# Patient Record
Sex: FEMALE | Race: WHITE | NOT HISPANIC OR LATINO
[De-identification: names, ages, dates, MRNs, and addresses within clinical notes are randomized per-mention and may not be internally consistent; named-entity substitution may affect disease eponyms.]

---

## 2017-08-21 ENCOUNTER — APPOINTMENT (OUTPATIENT)
Dept: PLASTIC SURGERY | Facility: CLINIC | Age: 22
End: 2017-08-21
Payer: COMMERCIAL

## 2017-08-21 VITALS
WEIGHT: 130 LBS | HEIGHT: 66 IN | DIASTOLIC BLOOD PRESSURE: 66 MMHG | BODY MASS INDEX: 20.89 KG/M2 | HEART RATE: 83 BPM | TEMPERATURE: 98.1 F | SYSTOLIC BLOOD PRESSURE: 104 MMHG

## 2017-08-21 PROCEDURE — G0429: CPT

## 2017-12-18 ENCOUNTER — APPOINTMENT (OUTPATIENT)
Dept: PLASTIC SURGERY | Facility: CLINIC | Age: 22
End: 2017-12-18
Payer: COMMERCIAL

## 2017-12-18 PROCEDURE — G0429: CPT

## 2017-12-18 PROCEDURE — 99199 UNLISTED SPECIAL SVC PX/RPRT: CPT | Mod: NC

## 2017-12-18 RX ORDER — LIDOCAINE 40 MG/G
4 CREAM TOPICAL
Qty: 1 | Refills: 0 | Status: ACTIVE | COMMUNITY
Start: 2017-12-18 | End: 1900-01-01

## 2018-03-12 ENCOUNTER — APPOINTMENT (OUTPATIENT)
Dept: PLASTIC SURGERY | Facility: CLINIC | Age: 23
End: 2018-03-12
Payer: COMMERCIAL

## 2018-03-12 PROCEDURE — G0429: CPT

## 2018-12-20 ENCOUNTER — APPOINTMENT (OUTPATIENT)
Dept: PLASTIC SURGERY | Facility: CLINIC | Age: 23
End: 2018-12-20
Payer: COMMERCIAL

## 2018-12-20 PROCEDURE — G0429: CPT

## 2020-04-21 ENCOUNTER — TRANSCRIPTION ENCOUNTER (OUTPATIENT)
Age: 25
End: 2020-04-21

## 2020-04-22 ENCOUNTER — INPATIENT (INPATIENT)
Facility: HOSPITAL | Age: 25
LOS: 0 days | Discharge: HOME | DRG: 743 | End: 2020-04-22
Attending: OBSTETRICS & GYNECOLOGY | Admitting: OBSTETRICS & GYNECOLOGY
Payer: COMMERCIAL

## 2020-04-22 VITALS
TEMPERATURE: 98 F | WEIGHT: 139.99 LBS | HEIGHT: 65 IN | HEART RATE: 85 BPM | RESPIRATION RATE: 19 BRPM | DIASTOLIC BLOOD PRESSURE: 73 MMHG | SYSTOLIC BLOOD PRESSURE: 102 MMHG | OXYGEN SATURATION: 94 %

## 2020-04-22 VITALS
RESPIRATION RATE: 16 BRPM | SYSTOLIC BLOOD PRESSURE: 117 MMHG | TEMPERATURE: 98 F | HEART RATE: 88 BPM | OXYGEN SATURATION: 100 % | DIASTOLIC BLOOD PRESSURE: 65 MMHG

## 2020-04-22 DIAGNOSIS — N83.519 TORSION OF OVARY AND OVARIAN PEDICLE, UNSPECIFIED SIDE: ICD-10-CM

## 2020-04-22 LAB
ALBUMIN SERPL ELPH-MCNC: 4.8 G/DL — SIGNIFICANT CHANGE UP (ref 3.3–5)
ALP SERPL-CCNC: 35 U/L — LOW (ref 40–120)
ALT FLD-CCNC: 24 U/L — SIGNIFICANT CHANGE UP (ref 10–45)
ANION GAP SERPL CALC-SCNC: 14 MMOL/L — SIGNIFICANT CHANGE UP (ref 5–17)
APTT BLD: 27.4 SEC — LOW (ref 27.5–36.3)
AST SERPL-CCNC: 20 U/L — SIGNIFICANT CHANGE UP (ref 10–40)
BASOPHILS # BLD AUTO: 0.01 K/UL — SIGNIFICANT CHANGE UP (ref 0–0.2)
BASOPHILS NFR BLD AUTO: 0.1 % — SIGNIFICANT CHANGE UP (ref 0–2)
BILIRUB SERPL-MCNC: 0.4 MG/DL — SIGNIFICANT CHANGE UP (ref 0.2–1.2)
BLD GP AB SCN SERPL QL: NEGATIVE — SIGNIFICANT CHANGE UP
BUN SERPL-MCNC: 14 MG/DL — SIGNIFICANT CHANGE UP (ref 7–23)
CALCIUM SERPL-MCNC: 9.8 MG/DL — SIGNIFICANT CHANGE UP (ref 8.4–10.5)
CHLORIDE SERPL-SCNC: 102 MMOL/L — SIGNIFICANT CHANGE UP (ref 96–108)
CO2 SERPL-SCNC: 25 MMOL/L — SIGNIFICANT CHANGE UP (ref 22–31)
CREAT SERPL-MCNC: 0.77 MG/DL — SIGNIFICANT CHANGE UP (ref 0.5–1.3)
EOSINOPHIL # BLD AUTO: 0.04 K/UL — SIGNIFICANT CHANGE UP (ref 0–0.5)
EOSINOPHIL NFR BLD AUTO: 0.6 % — SIGNIFICANT CHANGE UP (ref 0–6)
GLUCOSE SERPL-MCNC: 118 MG/DL — HIGH (ref 70–99)
HCG SERPL-ACNC: <2 MIU/ML — SIGNIFICANT CHANGE UP
HCT VFR BLD CALC: 40.4 % — SIGNIFICANT CHANGE UP (ref 34.5–45)
HGB BLD-MCNC: 13.7 G/DL — SIGNIFICANT CHANGE UP (ref 11.5–15.5)
IMM GRANULOCYTES NFR BLD AUTO: 0.3 % — SIGNIFICANT CHANGE UP (ref 0–1.5)
INR BLD: 1.08 RATIO — SIGNIFICANT CHANGE UP (ref 0.88–1.16)
LYMPHOCYTES # BLD AUTO: 1.86 K/UL — SIGNIFICANT CHANGE UP (ref 1–3.3)
LYMPHOCYTES # BLD AUTO: 26.7 % — SIGNIFICANT CHANGE UP (ref 13–44)
MCHC RBC-ENTMCNC: 31.9 PG — SIGNIFICANT CHANGE UP (ref 27–34)
MCHC RBC-ENTMCNC: 33.9 GM/DL — SIGNIFICANT CHANGE UP (ref 32–36)
MCV RBC AUTO: 94 FL — SIGNIFICANT CHANGE UP (ref 80–100)
MONOCYTES # BLD AUTO: 0.45 K/UL — SIGNIFICANT CHANGE UP (ref 0–0.9)
MONOCYTES NFR BLD AUTO: 6.5 % — SIGNIFICANT CHANGE UP (ref 2–14)
NEUTROPHILS # BLD AUTO: 4.59 K/UL — SIGNIFICANT CHANGE UP (ref 1.8–7.4)
NEUTROPHILS NFR BLD AUTO: 65.8 % — SIGNIFICANT CHANGE UP (ref 43–77)
NRBC # BLD: 0 /100 WBCS — SIGNIFICANT CHANGE UP (ref 0–0)
PLATELET # BLD AUTO: 279 K/UL — SIGNIFICANT CHANGE UP (ref 150–400)
POTASSIUM SERPL-MCNC: 3.9 MMOL/L — SIGNIFICANT CHANGE UP (ref 3.5–5.3)
POTASSIUM SERPL-SCNC: 3.9 MMOL/L — SIGNIFICANT CHANGE UP (ref 3.5–5.3)
PROT SERPL-MCNC: 7.7 G/DL — SIGNIFICANT CHANGE UP (ref 6–8.3)
PROTHROM AB SERPL-ACNC: 12.5 SEC — SIGNIFICANT CHANGE UP (ref 10–12.9)
RBC # BLD: 4.3 M/UL — SIGNIFICANT CHANGE UP (ref 3.8–5.2)
RBC # FLD: 11.7 % — SIGNIFICANT CHANGE UP (ref 10.3–14.5)
RH IG SCN BLD-IMP: POSITIVE — SIGNIFICANT CHANGE UP
RH IG SCN BLD-IMP: POSITIVE — SIGNIFICANT CHANGE UP
SARS-COV-2 RNA SPEC QL NAA+PROBE: SIGNIFICANT CHANGE UP
SODIUM SERPL-SCNC: 141 MMOL/L — SIGNIFICANT CHANGE UP (ref 135–145)
WBC # BLD: 6.97 K/UL — SIGNIFICANT CHANGE UP (ref 3.8–10.5)
WBC # FLD AUTO: 6.97 K/UL — SIGNIFICANT CHANGE UP (ref 3.8–10.5)

## 2020-04-22 PROCEDURE — 85027 COMPLETE CBC AUTOMATED: CPT

## 2020-04-22 PROCEDURE — 76830 TRANSVAGINAL US NON-OB: CPT | Mod: 26

## 2020-04-22 PROCEDURE — 93975 VASCULAR STUDY: CPT

## 2020-04-22 PROCEDURE — 99291 CRITICAL CARE FIRST HOUR: CPT

## 2020-04-22 PROCEDURE — 49000 EXPLORATION OF ABDOMEN: CPT

## 2020-04-22 PROCEDURE — 76830 TRANSVAGINAL US NON-OB: CPT

## 2020-04-22 PROCEDURE — 86850 RBC ANTIBODY SCREEN: CPT

## 2020-04-22 PROCEDURE — 84702 CHORIONIC GONADOTROPIN TEST: CPT

## 2020-04-22 PROCEDURE — 76856 US EXAM PELVIC COMPLETE: CPT | Mod: 26,59

## 2020-04-22 PROCEDURE — 87635 SARS-COV-2 COVID-19 AMP PRB: CPT

## 2020-04-22 PROCEDURE — 86900 BLOOD TYPING SEROLOGIC ABO: CPT

## 2020-04-22 PROCEDURE — 96375 TX/PRO/DX INJ NEW DRUG ADDON: CPT

## 2020-04-22 PROCEDURE — 80053 COMPREHEN METABOLIC PANEL: CPT

## 2020-04-22 PROCEDURE — 86901 BLOOD TYPING SEROLOGIC RH(D): CPT

## 2020-04-22 PROCEDURE — 99285 EMERGENCY DEPT VISIT HI MDM: CPT | Mod: 25

## 2020-04-22 PROCEDURE — C1889: CPT

## 2020-04-22 PROCEDURE — 93975 VASCULAR STUDY: CPT | Mod: 26

## 2020-04-22 PROCEDURE — 96374 THER/PROPH/DIAG INJ IV PUSH: CPT

## 2020-04-22 PROCEDURE — 85610 PROTHROMBIN TIME: CPT

## 2020-04-22 PROCEDURE — 76856 US EXAM PELVIC COMPLETE: CPT

## 2020-04-22 PROCEDURE — 85730 THROMBOPLASTIN TIME PARTIAL: CPT

## 2020-04-22 RX ORDER — HYDROMORPHONE HYDROCHLORIDE 2 MG/ML
0.5 INJECTION INTRAMUSCULAR; INTRAVENOUS; SUBCUTANEOUS
Refills: 0 | Status: DISCONTINUED | OUTPATIENT
Start: 2020-04-22 | End: 2020-04-22

## 2020-04-22 RX ORDER — OXYCODONE HYDROCHLORIDE 5 MG/1
1 TABLET ORAL
Qty: 5 | Refills: 0
Start: 2020-04-22 | End: 2020-04-22

## 2020-04-22 RX ORDER — SODIUM CHLORIDE 9 MG/ML
1000 INJECTION, SOLUTION INTRAVENOUS
Refills: 0 | Status: DISCONTINUED | OUTPATIENT
Start: 2020-04-22 | End: 2020-04-22

## 2020-04-22 RX ORDER — METOCLOPRAMIDE HCL 10 MG
10 TABLET ORAL ONCE
Refills: 0 | Status: DISCONTINUED | OUTPATIENT
Start: 2020-04-22 | End: 2020-04-22

## 2020-04-22 RX ORDER — HYDROMORPHONE HYDROCHLORIDE 2 MG/ML
0.5 INJECTION INTRAMUSCULAR; INTRAVENOUS; SUBCUTANEOUS ONCE
Refills: 0 | Status: DISCONTINUED | OUTPATIENT
Start: 2020-04-22 | End: 2020-04-22

## 2020-04-22 RX ORDER — ACETAMINOPHEN 500 MG
650 TABLET ORAL ONCE
Refills: 0 | Status: COMPLETED | OUTPATIENT
Start: 2020-04-22 | End: 2020-04-22

## 2020-04-22 RX ORDER — ONDANSETRON 8 MG/1
4 TABLET, FILM COATED ORAL ONCE
Refills: 0 | Status: COMPLETED | OUTPATIENT
Start: 2020-04-22 | End: 2020-04-22

## 2020-04-22 RX ORDER — ONDANSETRON 8 MG/1
4 TABLET, FILM COATED ORAL ONCE
Refills: 0 | Status: DISCONTINUED | OUTPATIENT
Start: 2020-04-22 | End: 2020-04-22

## 2020-04-22 RX ADMIN — HYDROMORPHONE HYDROCHLORIDE 0.5 MILLIGRAM(S): 2 INJECTION INTRAMUSCULAR; INTRAVENOUS; SUBCUTANEOUS at 10:24

## 2020-04-22 RX ADMIN — ONDANSETRON 4 MILLIGRAM(S): 8 TABLET, FILM COATED ORAL at 08:47

## 2020-04-22 RX ADMIN — Medication 650 MILLIGRAM(S): at 08:46

## 2020-04-22 NOTE — ED PROVIDER NOTE - CRITICAL CARE PROVIDED
documentation/consult w/ pt's family directly relating to pts condition/consultation with other physicians/conducted a detailed discussion of DNR status/direct patient care (not related to procedure)/interpretation of diagnostic studies/additional history taking

## 2020-04-22 NOTE — H&P ADULT - PROBLEM SELECTOR PLAN 1
- admit to gyn  - NPO  - pre op labs, T&S x2  - add on to OR for diagnostic laparoscopy, possible unilateral cystectomy, possible unilateral salpingectomy, possible unilateral oophorectomy    Fernanda Pizarro PGY3  dw Dr Do

## 2020-04-22 NOTE — ASU DISCHARGE PLAN (ADULT/PEDIATRIC) - CALL YOUR DOCTOR IF YOU HAVE ANY OF THE FOLLOWING:
Bleeding that does not stop/Swelling that gets worse/Increased irritability or sluggishness/Fever greater than (need to indicate Fahrenheit or Celsius)/Pain not relieved by Medications

## 2020-04-22 NOTE — PRE-ANESTHESIA EVALUATION ADULT - NSANTHOSAYNRD_GEN_A_CORE
No. BOAZ screening performed.  STOP BANG Legend: 0-2 = LOW Risk; 3-4 = INTERMEDIATE Risk; 5-8 = HIGH Risk

## 2020-04-22 NOTE — H&P ADULT - ATTENDING COMMENTS
Patient seen at bedside, agree with above assessment, with acute LLQ pain, received Dilaudid in ED and still in pain, TVUS with polycystic ovaries concern for torsion. Patient spoken to at bedside, understands risks associated with surgery including but not limited to VTE, bleeding, surgical site infection, damage to bowel/bladder. On ultrasound review no ovarian or adenxal/ mass seen, understands that if ovarian cyst is found will remove cyst, if fallopian tube torsion if found will remove fallopian tube, patient understands risk of losing ovary if uncontrolled bleeding. all questions answered. willing to accept blood products in case of emergency. consented for laparoscopic surgery possible laparotomy.

## 2020-04-22 NOTE — ED PROVIDER NOTE - CLINICAL SUMMARY MEDICAL DECISION MAKING FREE TEXT BOX
Impression:  DDx of HCG-negative LLQ/Pelvic pain includes ovarian cyst/torsion.  Patient already in US, but pain has been constant for 10hrs. If she has torsion, high likelihood of ovarian loss.  Plan:  TVUS, UA, basic labs, pelvic exam as soon as back from US, reassess.

## 2020-04-22 NOTE — ED PROVIDER NOTE - CARE PLAN
Principal Discharge DX:	Ovarian torsion  Secondary Diagnosis:	Ovarian mass, left  Secondary Diagnosis:	Cyst of left ovary

## 2020-04-22 NOTE — ED PROVIDER NOTE - ATTENDING CONTRIBUTION TO CARE
MD Parker:  patient seen and evaluated with the PA.  I was present for key portions of the History and Physical, and I agree with the Impression and Plan.    MD Parker:  25 yo F, c/o sharp LLQ pain.  Onset: 11pm.  Intensity: 9/10.  Quality:  sharp and stabbing.  Associated Sx: +nausea with waves of pain, but no diarrhea, no F/C, no VB, no VD, no back pain.   HCG negative.  VS: wnl.  Physical Exam: adult M, NAD, NCAT, PERRL, EOMI, neck supple, RRR, CTA B, Abd: s/nd/+LLQ TTP.  Ext: no edema, Neuro: AAOx3, ambulates w/o diff, strength 5/5 & symmetric throughout.  Impression:  DDx of HCG-negative LLQ/Pelvic pain includes ovarian cyst/torsion.  Patient already in US, but pain has been constant for 10hrs. If she has torsion, high likelihood of ovarian loss.  Plan:  TVUS, UA, basic labs, pelvic exam as soon as back from US, reassess.

## 2020-04-22 NOTE — ED ADULT NURSE NOTE - CHPI ED NUR SYMPTOMS NEG
no abdominal distension/no diarrhea/no blood in stool/no chills/no dysuria/no fever/no hematuria/no burning urination

## 2020-04-22 NOTE — ED ADULT NURSE NOTE - OBJECTIVE STATEMENT
25 yo F w/ no PMHx presents to ED via waiting room c/o LLQ pain. Pt states pain began last night, has been persistent since. Associated w/ nausea and vomiting, worsened by PO intake. Pt reports she took tylenol PTA, but vomited a few minutes after. States LMP 3/2020, has been irregular in the past, this pain is worse than other symptoms/cramps in the past. Abd soft, not distended, tender over LLQ. Rates pain 10/10. No vaginal discharge or bleeding. Sexually active, not currently on OCP. No recent changes in PO intake. Pt reports some worsening of pain w/ urination but denies burning, hematuria, dysuria. Pt denies any CP, SOB, fever, chills, constipation, diarrhea, HA, dizziness, weakness. Pt A&Ox4, lungs CTA, +central pulses. Ambulating w/ steady gait, safety and comfort maintained, no acute distress noted at this time.

## 2020-04-22 NOTE — ED ADULT NURSE REASSESSMENT NOTE - NS ED NURSE REASSESS COMMENT FT1
Pt returned from US exam. Reports no change in pain, had additional episode of vomiting after tylenol administration. PA notified.

## 2020-04-22 NOTE — H&P ADULT - ASSESSMENT
25yo P0 PMH PCOS presents to the ED this morning due to severe LLQ pain beginning at 11pm last night.  Clinical exam and TVUS suspicious for torsion.

## 2020-04-22 NOTE — H&P ADULT - HISTORY OF PRESENT ILLNESS
JOCE MEDINA  24y  Female [id number]    HPI:  25yo P0 PMH PCOS presents to the ED this morning due to severe LLQ pain beginning at 11pm last night. Pt reports pain began suddenly and has not experienced anything like this prior. Pain caused her to have multiple episodes of nausea and vomiting.  In the ED pt had additional episodes of nausea and vomiting requiring zofran and pain requiring Dilaudid and tylenol. Denies vaginal bleeding, chest pain, SOB, palpitations.      Name of GYN Physician: Dr Rao    POB: none    Pgyn: +PCOS, diagnosed 2 years ago, placed on OCPs, has since d/c   PMH: denies  PSH: tonsillectomy in childhood  All: NKDA    Home meds: none    Hospital Meds:   MEDICATIONS  (STANDING):  HYDROmorphone  Injectable 0.5 milliGRAM(s) IV Push Once    MEDICATIONS  (PRN):      Social History:  Denies smoking use, drug use, alcohol use.

## 2020-04-22 NOTE — ASU DISCHARGE PLAN (ADULT/PEDIATRIC) - ASU DC SPECIAL INSTRUCTIONSFT
Contact your doctor if you are soaking a pad every 30 minutes to an hour or experience clots. Call your doctor for any SIGNS OR SYMPTOMS OF INFECTION: Fever, chills, temperature  100.4 or higher or have a foul smelling discharge.    Medications for discharge:  pain: tylenol 957mg every 6 hours and motrin 600 mg every 6 hours, a Rx for oxycodone has been sent to the pharmacy, take for severe pain  gas pain: simethicone (gas ex) every 6 hours as needed  constipation: colace twice a day, senna tablets (over the counter)    follow up in 2 weeks with Dr Rao

## 2020-04-22 NOTE — ED PROVIDER NOTE - PROGRESS NOTE DETAILS
MD Parker:  called by radiology attg regarding US results.  Large, heterogenous-appearing, cystic L ovary with diminished flow.  Concerning for Torsion.  OB/GYN immediately consulted.  Patient is traveling down to ED from US for GYN bedside evaluation. MD Parker:  ob/gyn agrees that case is concerning for torsion.  patient admitted and likely to OR

## 2020-04-22 NOTE — ASU DISCHARGE PLAN (ADULT/PEDIATRIC) - CARE PROVIDER_API CALL
Tasia Rao)  Obstetrics and Gynecology  [address] [facility], [address]  [place] [address]  Phone: [phone]  Fax: [phone]  Follow Up Time:

## 2020-04-22 NOTE — ED PROVIDER NOTE - OBJECTIVE STATEMENT
23yo F with no significant PMH presenting with sharp, constant, LLQ/L pelvic pain worsening since 11pm last night. Pain is now 10/10 in severity, radiates to L lower back. + associated nausea and 1-2 episodes of nb/nb vomiting. Unable to tolerate PO. Took tylenol but then vomited. Nothing makes pain better.  Feels discomfort in LLQ when 23yo F with no significant PMH presenting with sharp, constant, LLQ/L pelvic pain worsening since 11pm last night. Pain is now 10/10 in severity, radiates to L lower back. + associated nausea and 1-2 episodes of nb/nb vomiting. Unable to tolerate PO. Took tylenol but then vomited. Nothing makes pain better.  Feels discomfort in LLQ when urinating. Of note, reports h/o irregular menses and b/l ovarian cysts. LMP last week of March 2020. Stopped OCP use in 2018. Sexually active. Denies any fever/chills, recent illness, URI sx, diarrhea, dysuria, abnormal vaginal discharge, vaginal bleeding. 25yo F with no significant PMH presenting with sharp, constant, LLQ/L pelvic pain worsening since 11pm last night. Pain is now 10/10 in severity, radiates to L lower back. + associated nausea and 1-2 episodes of nb/nb vomiting. Unable to tolerate PO. Took tylenol but then vomited. Never had pain like this before. Feels better when lying on R side. Feels discomfort in LLQ when urinating. Of note, reports h/o irregular menses and b/l ovarian cysts. LMP last week of March 2020. Stopped OCP use in 2018. Sexually active. Denies any fever/chills, recent illness, URI sx, diarrhea, dysuria, abnormal vaginal discharge, vaginal bleeding.

## 2020-04-22 NOTE — BRIEF OPERATIVE NOTE - OPERATION/FINDINGS
IP ligament of left ovary torsed x3. ovary appeared multicystic.  Left fallopian tube appeared enlarged  right ovary and tube wnl IP ligament of left ovary torsed x3. ovary appeared multicystic.  Left fallopian tube appeared enlarged  right ovary and tube [facility]    dictation# [id number]

## 2020-04-22 NOTE — H&P ADULT - NSHPLABSRESULTS_GEN_ALL_CORE
LABS:                            13.7   6.97  )-----------( 279      ( 22 Apr 2020 08:58 )             40.4     04-22    141  |  102  |  14  ----------------------------<  118<H>  3.9   |  25  |  0.77    Ca    9.8      22 Apr 2020 08:58    TPro  7.7  /  Alb  4.8  /  TBili  0.4  /  DBili  x   /  AST  20  /  ALT  24  /  AlkPhos  35<L>  04-22    I&O's Detail          RADIOLOGY & ADDITIONAL STUDIES:  < from: US Transvaginal (04.22.20 @ 09:43) >      EXAM:  US PELVIC COMPLETE                          EXAM:  US TRANSVAGINAL                          EXAM:  US DPLX PELVIC                            PROCEDURE DATE:  04/22/2020            INTERPRETATION:  CLINICAL INFORMATION: Left adnexal pain since 11:00 PM 4/21/2020. Given Tylenol. Patient gives history of PCO S.     LMP: 3/25/2020    COMPARISON: None available.    TECHNIQUE:     Transabdominal and transvaginal pelvic sonography was performed. Color and spectral Doppler imaging was performed to assess for blood flow within the ovaries bilaterally.    FINDINGS:    Uterus: Anteverted measuring 7 cm x 3.9 cm x 4.7 cm. cm. Within normal limits.    Endometrium: 11 mm. Within normal limits.    Right ovary: 2.1 cm x 3.5 cm x 2 cm. No mass or cyst. Color and spectral Doppler imaging reveals arterial and venous blood flow. Within normal limits.     Left ovary: Enlarged measuring 6.6 cm x 2.7 cm x 5 cm cm. Heterogeneous central stroma. Peripheral displacement of follicles. Arterial and venous blood flow is documented with color and spectral Doppler imaging. Adjacent to the left ovary is an abnormal appearing fallopian tube/vascular pedicle with some twisting appreciated.    Fluid: Small free fluid within the cul-de-sac.    IMPRESSION:    Enlarged abnormal left ovary with heterogeneous central stroma. Adjacent to the left ovary is an abnormal appearing left adnexal structure representing the fallopian tube/vascular pedicle with a twisted appearance. Patient has pain directly when scanning the left ovary/left adnexal region. Despite obtaining vascular blood flow within the left ovarian parenchyma, findings are highly suspicious for intermittent left ovarian/tubal torsion.    There is associated small free fluid within the cul-de-sac.    Dr. Allen is aware. 4/22/2020 at 9:50 AM.                    NI DANIEL M.D., ATTENDING RADIOLOGIST  This document has been electronically signed. Apr 22 2020  9:50AM                < end of copied text >

## 2020-04-22 NOTE — H&P ADULT - NSHPPHYSICALEXAM_GEN_ALL_CORE
Vital Signs Last 24 Hrs  T(C): 36.8 (22 Apr 2020 08:28), Max: 36.8 (22 Apr 2020 08:11)  T(F): 98.2 (22 Apr 2020 08:28), Max: 98.2 (22 Apr 2020 08:11)  HR: 73 (22 Apr 2020 08:28) (73 - 85)  BP: 119/78 (22 Apr 2020 08:28) (102/73 - 119/78)  BP(mean): --  RR: 18 (22 Apr 2020 08:28) (18 - 19)  SpO2: 100% (22 Apr 2020 08:28) (94% - 100%)    Physical Exam:   General: sitting comfortably in bed, NAD   Abd: Soft, non distended, LLQ tenderness, no rebound or guarding   :  No bleeding on pad.  uterus wnl, R adnexa wnl, L adnexal tenderness, enlarged L adnexa   Ext: non-tender b/l, no edema

## 2020-07-23 ENCOUNTER — APPOINTMENT (OUTPATIENT)
Dept: PLASTIC SURGERY | Facility: CLINIC | Age: 25
End: 2020-07-23

## 2020-11-17 NOTE — ED PROVIDER NOTE - DATE/TIME 1
Patient left a voice mail   Looking for the status of the [facility] medcial for her supplies     Have they been faxed back in    22-Apr-2020 09:59

## 2021-03-22 ENCOUNTER — APPOINTMENT (OUTPATIENT)
Dept: PLASTIC SURGERY | Facility: CLINIC | Age: 26
End: 2021-03-22
Payer: COMMERCIAL

## 2021-03-22 PROCEDURE — G0429: CPT

## 2021-03-22 PROCEDURE — 99072 ADDL SUPL MATRL&STAF TM PHE: CPT

## 2021-04-13 ENCOUNTER — TRANSCRIPTION ENCOUNTER (OUTPATIENT)
Age: 26
End: 2021-04-13

## 2021-06-28 ENCOUNTER — APPOINTMENT (OUTPATIENT)
Dept: PLASTIC SURGERY | Facility: CLINIC | Age: 26
End: 2021-06-28
Payer: COMMERCIAL

## 2021-06-28 VITALS — HEIGHT: 65 IN | BODY MASS INDEX: 21.33 KG/M2 | WEIGHT: 128 LBS

## 2021-06-28 DIAGNOSIS — Z80.49 FAMILY HISTORY OF MALIGNANT NEOPLASM OF OTHER GENITAL ORGANS: ICD-10-CM

## 2021-06-28 DIAGNOSIS — N64.81 PTOSIS OF BREAST: ICD-10-CM

## 2021-06-28 DIAGNOSIS — Z86.39 PERSONAL HISTORY OF OTHER ENDOCRINE, NUTRITIONAL AND METABOLIC DISEASE: ICD-10-CM

## 2021-06-28 DIAGNOSIS — Z80.3 FAMILY HISTORY OF MALIGNANT NEOPLASM OF BREAST: ICD-10-CM

## 2021-06-28 DIAGNOSIS — Z87.19 PERSONAL HISTORY OF OTHER DISEASES OF THE DIGESTIVE SYSTEM: ICD-10-CM

## 2021-06-28 DIAGNOSIS — Z78.9 OTHER SPECIFIED HEALTH STATUS: ICD-10-CM

## 2021-06-28 PROCEDURE — 99202 OFFICE O/P NEW SF 15 MIN: CPT

## 2021-06-28 PROCEDURE — 99072 ADDL SUPL MATRL&STAF TM PHE: CPT

## 2021-06-28 PROCEDURE — 99499Q: CUSTOM | Mod: NC

## 2021-06-28 RX ORDER — LINACLOTIDE 72 UG/1
CAPSULE, GELATIN COATED ORAL
Refills: 0 | Status: ACTIVE | COMMUNITY

## 2021-06-28 RX ORDER — LEVOTHYROXINE SODIUM 137 UG/1
TABLET ORAL
Refills: 0 | Status: ACTIVE | COMMUNITY

## 2021-06-28 RX ORDER — NORETHINDRONE ACETATE AND ETHINYL ESTRADIOL, AND FERROUS FUMARATE 1MG-20(24)
KIT ORAL
Refills: 0 | Status: ACTIVE | COMMUNITY

## 2021-08-02 ENCOUNTER — TRANSCRIPTION ENCOUNTER (OUTPATIENT)
Age: 26
End: 2021-08-02

## 2021-08-06 ENCOUNTER — TRANSCRIPTION ENCOUNTER (OUTPATIENT)
Age: 26
End: 2021-08-06

## 2021-12-30 ENCOUNTER — OUTPATIENT (OUTPATIENT)
Dept: OUTPATIENT SERVICES | Facility: HOSPITAL | Age: 26
LOS: 1 days | End: 2021-12-30

## 2021-12-30 VITALS
TEMPERATURE: 98 F | RESPIRATION RATE: 16 BRPM | OXYGEN SATURATION: 99 % | WEIGHT: 138.89 LBS | SYSTOLIC BLOOD PRESSURE: 113 MMHG | HEART RATE: 64 BPM | DIASTOLIC BLOOD PRESSURE: 70 MMHG | HEIGHT: 64 IN

## 2021-12-30 DIAGNOSIS — Z90.89 ACQUIRED ABSENCE OF OTHER ORGANS: Chronic | ICD-10-CM

## 2021-12-30 DIAGNOSIS — Z86.39 PERSONAL HISTORY OF OTHER ENDOCRINE, NUTRITIONAL AND METABOLIC DISEASE: ICD-10-CM

## 2021-12-30 DIAGNOSIS — M21.612 BUNION OF LEFT FOOT: ICD-10-CM

## 2021-12-30 DIAGNOSIS — Z98.890 OTHER SPECIFIED POSTPROCEDURAL STATES: Chronic | ICD-10-CM

## 2021-12-30 DIAGNOSIS — M20.12 HALLUX VALGUS (ACQUIRED), LEFT FOOT: ICD-10-CM

## 2021-12-30 LAB
ANION GAP SERPL CALC-SCNC: 13 MMOL/L — SIGNIFICANT CHANGE UP (ref 7–14)
BUN SERPL-MCNC: 18 MG/DL — SIGNIFICANT CHANGE UP (ref 7–23)
CALCIUM SERPL-MCNC: 9.5 MG/DL — SIGNIFICANT CHANGE UP (ref 8.4–10.5)
CHLORIDE SERPL-SCNC: 103 MMOL/L — SIGNIFICANT CHANGE UP (ref 98–107)
CO2 SERPL-SCNC: 24 MMOL/L — SIGNIFICANT CHANGE UP (ref 22–31)
CREAT SERPL-MCNC: 0.97 MG/DL — SIGNIFICANT CHANGE UP (ref 0.5–1.3)
GLUCOSE SERPL-MCNC: 83 MG/DL — SIGNIFICANT CHANGE UP (ref 70–99)
HCG UR QL: NEGATIVE — SIGNIFICANT CHANGE UP
HCT VFR BLD CALC: 38.6 % — SIGNIFICANT CHANGE UP (ref 34.5–45)
HGB BLD-MCNC: 13.2 G/DL — SIGNIFICANT CHANGE UP (ref 11.5–15.5)
MCHC RBC-ENTMCNC: 32.4 PG — SIGNIFICANT CHANGE UP (ref 27–34)
MCHC RBC-ENTMCNC: 34.2 GM/DL — SIGNIFICANT CHANGE UP (ref 32–36)
MCV RBC AUTO: 94.8 FL — SIGNIFICANT CHANGE UP (ref 80–100)
NRBC # BLD: 0 /100 WBCS — SIGNIFICANT CHANGE UP
NRBC # FLD: 0 K/UL — SIGNIFICANT CHANGE UP
PLATELET # BLD AUTO: 311 K/UL — SIGNIFICANT CHANGE UP (ref 150–400)
POTASSIUM SERPL-MCNC: 3.9 MMOL/L — SIGNIFICANT CHANGE UP (ref 3.5–5.3)
POTASSIUM SERPL-SCNC: 3.9 MMOL/L — SIGNIFICANT CHANGE UP (ref 3.5–5.3)
RBC # BLD: 4.07 M/UL — SIGNIFICANT CHANGE UP (ref 3.8–5.2)
RBC # FLD: 11.9 % — SIGNIFICANT CHANGE UP (ref 10.3–14.5)
SODIUM SERPL-SCNC: 140 MMOL/L — SIGNIFICANT CHANGE UP (ref 135–145)
WBC # BLD: 5.42 K/UL — SIGNIFICANT CHANGE UP (ref 3.8–10.5)
WBC # FLD AUTO: 5.42 K/UL — SIGNIFICANT CHANGE UP (ref 3.8–10.5)

## 2021-12-30 RX ORDER — LEVOTHYROXINE SODIUM 125 MCG
1 TABLET ORAL
Qty: 0 | Refills: 0 | DISCHARGE

## 2021-12-30 RX ORDER — LINACLOTIDE 145 UG/1
1 CAPSULE, GELATIN COATED ORAL
Qty: 0 | Refills: 0 | DISCHARGE

## 2021-12-30 RX ORDER — NORETHINDRONE AND ETHINYL ESTRADIOL 0.4-0.035
1 KIT ORAL
Qty: 0 | Refills: 0 | DISCHARGE

## 2021-12-30 NOTE — H&P PST ADULT - PROBLEM SELECTOR PLAN 1
Left Foot Minimal Invasive Bunionectomy     Pre op instructions reviewed with pt including Hibiclens with teach back, pt verbalized good understanding of pre op instructions    Cup provided for UCG dos

## 2021-12-30 NOTE — H&P PST ADULT - NSICDXPASTMEDICALHX_GEN_ALL_CORE_FT
PAST MEDICAL HISTORY:  COVID 8/21    H/O Hashimoto thyroiditis DX 10/20    History of IBS      PAST MEDICAL HISTORY:  COVID 8/21    H/O Hashimoto thyroiditis DX 10/20. pt states last thyroid studies 2020    History of IBS

## 2021-12-30 NOTE — H&P PST ADULT - NSICDXPASTSURGICALHX_GEN_ALL_CORE_FT
PAST SURGICAL HISTORY:  S/P laparoscopy Revision of Ovarian torsion 4/2020    S/P tonsillectomy and adenoidectomy

## 2021-12-30 NOTE — H&P PST ADULT - NSALCOHOLTYPE_GEN__A_CORE_SD
liquor [FreeTextEntry1] : Thyroid nodule- fu with endo\par Labs discussed\par Continue diet and exercise\par Fu in 6 months for routine check up

## 2021-12-30 NOTE — H&P PST ADULT - ADMIT DATE
Prevention Guidelines, Men Ages 48 to 59  Screening tests and vaccines are an important part of managing your health. Health counseling is essential, too. Below are guidelines for these, for men ages 48 to 59.  Talk with your healthcare provider to make s Syphilis Men at increased risk for infection – talk with your healthcare provider At routine exams   Tuberculosis Men at increased risk for infection – talk with your healthcare provider Ask your healthcare provider   Vision All men in this age group Ask y Counseling Who needs it How often   Diet and exercise Men who are overweight or obese When diagnosed, and then at routine exams   Sexually transmitted infection prevention Men at increased risk for infection – talk with your healthcare provider At routine 30-Dec-2021

## 2021-12-30 NOTE — H&P PST ADULT - HISTORY OF PRESENT ILLNESS
Pt is a 26 y.o. female ; pt reports h/o bunion left foot ; pt to surgeon ; pt now presents for Left Foot Minimal Invasive Bunionectomy

## 2021-12-30 NOTE — H&P PST ADULT - PROBLEM SELECTOR PLAN 2
Pt on Unithroid daily    Pt states last thyroid studies 2020; Call to Dr Victoria ; [ endocrinologist ] office closed till Jan 04, 2021  . Pt to Dr Arora for pre op evaluation; thyroid studies to be done pre op     Surgeon to be notified

## 2022-02-07 ENCOUNTER — APPOINTMENT (OUTPATIENT)
Dept: PLASTIC SURGERY | Facility: CLINIC | Age: 27
End: 2022-02-07
Payer: SELF-PAY

## 2022-02-07 PROCEDURE — G0429: CPT

## 2022-05-26 ENCOUNTER — NON-APPOINTMENT (OUTPATIENT)
Age: 27
End: 2022-05-26

## 2022-07-21 PROBLEM — U07.1 COVID-19: Chronic | Status: ACTIVE | Noted: 2021-12-30

## 2022-07-21 PROBLEM — Z87.19 PERSONAL HISTORY OF OTHER DISEASES OF THE DIGESTIVE SYSTEM: Chronic | Status: ACTIVE | Noted: 2021-12-30

## 2022-07-21 PROBLEM — Z86.39 PERSONAL HISTORY OF OTHER ENDOCRINE, NUTRITIONAL AND METABOLIC DISEASE: Chronic | Status: ACTIVE | Noted: 2021-12-30

## 2022-07-24 ENCOUNTER — NON-APPOINTMENT (OUTPATIENT)
Age: 27
End: 2022-07-24

## 2022-08-18 ENCOUNTER — APPOINTMENT (OUTPATIENT)
Dept: PLASTIC SURGERY | Facility: CLINIC | Age: 27
End: 2022-08-18

## 2022-11-23 ENCOUNTER — APPOINTMENT (OUTPATIENT)
Dept: PLASTIC SURGERY | Facility: CLINIC | Age: 27
End: 2022-11-23

## 2022-11-23 VITALS
HEIGHT: 65 IN | WEIGHT: 130 LBS | DIASTOLIC BLOOD PRESSURE: 81 MMHG | OXYGEN SATURATION: 98 % | TEMPERATURE: 98 F | BODY MASS INDEX: 21.66 KG/M2 | SYSTOLIC BLOOD PRESSURE: 115 MMHG | HEART RATE: 78 BPM

## 2022-11-23 PROCEDURE — G0429: CPT

## 2023-01-25 ENCOUNTER — NON-APPOINTMENT (OUTPATIENT)
Age: 28
End: 2023-01-25

## 2023-03-16 ENCOUNTER — NON-APPOINTMENT (OUTPATIENT)
Age: 28
End: 2023-03-16

## 2023-03-27 ENCOUNTER — NON-APPOINTMENT (OUTPATIENT)
Age: 28
End: 2023-03-27

## 2023-04-14 ENCOUNTER — APPOINTMENT (OUTPATIENT)
Dept: ULTRASOUND IMAGING | Facility: HOSPITAL | Age: 28
End: 2023-04-14

## 2023-06-27 NOTE — ED ADULT NURSE NOTE - CAS TRG GENERAL AIRWAY, MLM
Name band; Patent Dapsone Counseling: I discussed with the patient the risks of dapsone including but not limited to hemolytic anemia, agranulocytosis, rashes, methemoglobinemia, kidney failure, peripheral neuropathy, headaches, GI upset, and liver toxicity.  Patients who start dapsone require monitoring including baseline LFTs and weekly CBCs for the first month, then every month thereafter.  The patient verbalized understanding of the proper use and possible adverse effects of dapsone.  All of the patient's questions and concerns were addressed.

## 2023-09-12 ENCOUNTER — NON-APPOINTMENT (OUTPATIENT)
Age: 28
End: 2023-09-12

## 2023-10-30 ENCOUNTER — APPOINTMENT (OUTPATIENT)
Dept: PLASTIC SURGERY | Facility: CLINIC | Age: 28
End: 2023-10-30
Payer: SELF-PAY

## 2023-10-30 DIAGNOSIS — Z41.1 ENCOUNTER FOR COSMETIC SURGERY: ICD-10-CM

## 2023-10-30 PROCEDURE — G0429: CPT

## 2024-03-19 ENCOUNTER — NON-APPOINTMENT (OUTPATIENT)
Age: 29
End: 2024-03-19

## 2025-02-24 ENCOUNTER — NON-APPOINTMENT (OUTPATIENT)
Age: 30
End: 2025-02-24